# Patient Record
Sex: FEMALE | Race: WHITE | URBAN - METROPOLITAN AREA
[De-identification: names, ages, dates, MRNs, and addresses within clinical notes are randomized per-mention and may not be internally consistent; named-entity substitution may affect disease eponyms.]

---

## 2022-07-12 SDOH — HEALTH STABILITY: PHYSICAL HEALTH: ON AVERAGE, HOW MANY DAYS PER WEEK DO YOU ENGAGE IN MODERATE TO STRENUOUS EXERCISE (LIKE A BRISK WALK)?: 2 DAYS

## 2022-07-12 SDOH — HEALTH STABILITY: PHYSICAL HEALTH: ON AVERAGE, HOW MANY MINUTES DO YOU ENGAGE IN EXERCISE AT THIS LEVEL?: 40 MIN

## 2022-07-12 ASSESSMENT — SOCIAL DETERMINANTS OF HEALTH (SDOH)
WITHIN THE LAST YEAR, HAVE YOU BEEN KICKED, HIT, SLAPPED, OR OTHERWISE PHYSICALLY HURT BY YOUR PARTNER OR EX-PARTNER?: NO
WITHIN THE LAST YEAR, HAVE YOU BEEN AFRAID OF YOUR PARTNER OR EX-PARTNER?: NO
WITHIN THE LAST YEAR, HAVE TO BEEN RAPED OR FORCED TO HAVE ANY KIND OF SEXUAL ACTIVITY BY YOUR PARTNER OR EX-PARTNER?: NO
WITHIN THE LAST YEAR, HAVE YOU BEEN HUMILIATED OR EMOTIONALLY ABUSED IN OTHER WAYS BY YOUR PARTNER OR EX-PARTNER?: NO

## 2022-07-14 ENCOUNTER — OFFICE VISIT (OUTPATIENT)
Dept: PRIMARY CARE CLINIC | Age: 32
End: 2022-07-14
Payer: COMMERCIAL

## 2022-07-14 VITALS
WEIGHT: 186.6 LBS | DIASTOLIC BLOOD PRESSURE: 64 MMHG | SYSTOLIC BLOOD PRESSURE: 123 MMHG | TEMPERATURE: 96.6 F | BODY MASS INDEX: 29.99 KG/M2 | HEIGHT: 66 IN | HEART RATE: 68 BPM

## 2022-07-14 DIAGNOSIS — Z00.00 ANNUAL PHYSICAL EXAM: ICD-10-CM

## 2022-07-14 DIAGNOSIS — Z11.59 ENCOUNTER FOR HEPATITIS C SCREENING TEST FOR LOW RISK PATIENT: ICD-10-CM

## 2022-07-14 DIAGNOSIS — Z00.00 ANNUAL PHYSICAL EXAM: Primary | ICD-10-CM

## 2022-07-14 DIAGNOSIS — Z11.4 ENCOUNTER FOR SCREENING FOR HUMAN IMMUNODEFICIENCY VIRUS (HIV): ICD-10-CM

## 2022-07-14 DIAGNOSIS — I73.00 RAYNAUD'S DISEASE WITHOUT GANGRENE: ICD-10-CM

## 2022-07-14 PROBLEM — F41.9 ANXIETY: Status: ACTIVE | Noted: 2022-07-14

## 2022-07-14 LAB
A/G RATIO: 2.2 (ref 1.1–2.2)
ALBUMIN SERPL-MCNC: 4.8 G/DL (ref 3.4–5)
ALP BLD-CCNC: 76 U/L (ref 40–129)
ALT SERPL-CCNC: 9 U/L (ref 10–40)
ANION GAP SERPL CALCULATED.3IONS-SCNC: 10 MMOL/L (ref 3–16)
AST SERPL-CCNC: 14 U/L (ref 15–37)
BILIRUB SERPL-MCNC: 0.4 MG/DL (ref 0–1)
BUN BLDV-MCNC: 9 MG/DL (ref 7–20)
CALCIUM SERPL-MCNC: 9.4 MG/DL (ref 8.3–10.6)
CHLORIDE BLD-SCNC: 102 MMOL/L (ref 99–110)
CHOLESTEROL, TOTAL: 190 MG/DL (ref 0–199)
CO2: 27 MMOL/L (ref 21–32)
CREAT SERPL-MCNC: 0.6 MG/DL (ref 0.6–1.1)
GFR AFRICAN AMERICAN: >60
GFR NON-AFRICAN AMERICAN: >60
GLUCOSE BLD-MCNC: 87 MG/DL (ref 70–99)
HDLC SERPL-MCNC: 60 MG/DL (ref 40–60)
HEPATITIS C ANTIBODY INTERPRETATION: NORMAL
LDL CHOLESTEROL CALCULATED: 118 MG/DL
POTASSIUM SERPL-SCNC: 4.7 MMOL/L (ref 3.5–5.1)
SODIUM BLD-SCNC: 139 MMOL/L (ref 136–145)
TOTAL PROTEIN: 7 G/DL (ref 6.4–8.2)
TRIGL SERPL-MCNC: 59 MG/DL (ref 0–150)
VLDLC SERPL CALC-MCNC: 12 MG/DL

## 2022-07-14 PROCEDURE — 90715 TDAP VACCINE 7 YRS/> IM: CPT | Performed by: STUDENT IN AN ORGANIZED HEALTH CARE EDUCATION/TRAINING PROGRAM

## 2022-07-14 PROCEDURE — 90471 IMMUNIZATION ADMIN: CPT | Performed by: STUDENT IN AN ORGANIZED HEALTH CARE EDUCATION/TRAINING PROGRAM

## 2022-07-14 PROCEDURE — 99385 PREV VISIT NEW AGE 18-39: CPT | Performed by: STUDENT IN AN ORGANIZED HEALTH CARE EDUCATION/TRAINING PROGRAM

## 2022-07-14 ASSESSMENT — PATIENT HEALTH QUESTIONNAIRE - PHQ9
SUM OF ALL RESPONSES TO PHQ9 QUESTIONS 1 & 2: 0
1. LITTLE INTEREST OR PLEASURE IN DOING THINGS: 0
SUM OF ALL RESPONSES TO PHQ QUESTIONS 1-9: 0
2. FEELING DOWN, DEPRESSED OR HOPELESS: 0

## 2022-07-14 ASSESSMENT — ENCOUNTER SYMPTOMS
DIARRHEA: 0
CONSTIPATION: 0
COUGH: 0
SHORTNESS OF BREATH: 0

## 2022-07-14 NOTE — PROGRESS NOTES
[facility]  Establish care visit   2022    Noni Marley (:  1990) is a 29 y.o. female, here to establish care. Chief Complaint   Patient presents with   [facility] Other     feels like she is having some blood circulation issues, leg issues         ASSESSMENT/ PLAN  1. Annual physical exam  Screening labs ordered, vaccines updated today. Patient to follow-up with OB/GYN for Pap smear.  - Comprehensive Metabolic Panel; Future  - Hemoglobin A1C; Future  - Lipid Panel; Future    2. Encounter for hepatitis C screening test for low risk patient  Screen  - Hepatitis C Antibody; Future    3. Encounter for screening for human immunodeficiency virus (HIV)  Screen  - HIV Screen; Future    4. Raynaud's disease without gangrene  Uncontrolled, this is a new problem. Likely secondary to Raynaud's phenomenon, will check MANUEL although little likelihood of secondary cause. Recommended restarting exercise. - MANUEL Reflex to Antibody Cascade; Future       Return in about 1 year (around 2023) for annual physical.    HPI  Patient presents today to establish care with concerns of circulation problems. She notes skin discoloration, cold sensation in her bilateral toes for many years. Since summer started, she also endorses some bilateral lower extremity swelling. No personal or family history of lupus. No numbness, weakness of her lower extremity. No complaints in her hands. Patient lives at home with her boyfriend. She works from home for an [employer] firm in [facility]. No formal exercise plan at this time, but she is looking to rejoin [employer].  + Healthy, balanced diet. Last Pap smear 4 years ago, reportedly normal.  Birth control is condoms. No significant substance use. Family history includes breast cancer in maternal grandmother. ROS  Review of Systems   Constitutional: Negative for fatigue and fever. HENT: Negative for congestion.     Respiratory: Negative for cough and shortness of breath. Cardiovascular: Negative for leg swelling. Gastrointestinal: Negative for constipation and diarrhea. Genitourinary: Negative for dysuria. Neurological: Negative for headaches. Psychiatric/Behavioral: Negative for sleep disturbance. The patient is not nervous/anxious. HISTORIES  No current outpatient medications on file prior to visit. No current facility-administered medications on file prior to visit. Allergies   Allergen Reactions    Penicillins Rash       History reviewed. No pertinent past medical history. Patient Active Problem List   Diagnosis    Anxiety       History reviewed. No pertinent surgical history. Social History     Socioeconomic History    Marital status: Life Partner     Spouse name: Shawn Joseph Number of children: Not on file    Years of education: Not on file    Highest education level: Not on file   Occupational History     Comment: Advertising   Tobacco Use    Smoking status: Never Smoker    Smokeless tobacco: Never Used   Vaping Use    Vaping Use: Never used   Substance and Sexual Activity    Alcohol use: Yes     Comment: occo    Drug use: Never    Sexual activity: Yes     Partners: Male     Birth control/protection: Condom   Other Topics Concern    Not on file   Social History Narrative    She lives with her boyfriend and feels safe      Social Determinants of Health     Financial Resource Strain:     Difficulty of Paying Living Expenses: Not on file   Food Insecurity:     Worried About Running Out of Food in the Last Year: Not on file    Ana of Food in the Last Year: Not on file   Transportation Needs:     Lack of Transportation (Medical): Not on file    Lack of Transportation (Non-Medical):  Not on file   Physical Activity: Insufficiently Active    Days of Exercise per Week: 2 days    Minutes of Exercise per Session: 40 min   Stress:     Feeling of Stress : Not on file   Social Connections:     Frequency of Communication with Friends and Family: Not on file    Frequency of Social Gatherings with Friends and Family: Not on file    Attends Cheondoism Services: Not on file    Active Member of Clubs or Organizations: Not on file    Attends Club or Organization Meetings: Not on file    Marital Status: Not on file   Intimate Partner Violence: Not At Risk    Fear of Current or Ex-Partner: No    Emotionally Abused: No    Physically Abused: No    Sexually Abused: No   Housing Stability:     Unable to Pay for Housing in the Last Year: Not on file    Number of [place] in the Last Year: Not on file    Unstable Housing in the Last Year: Not on file        Family History   Problem Relation Age of Onset    Cancer Maternal Grandmother        PE  Vitals:    07/14/22 0807   BP: 123/64   Site: Left Upper Arm   Position: Sitting   Cuff Size: Large Adult   Pulse: 68   Temp: (!) 96.6 °F (35.9 °C)   TempSrc: Temporal   Weight: 186 lb 9.6 oz (84.6 kg)   Height: 5' 5.85\" (1.673 m)     Estimated body mass index is 30.26 kg/m² as calculated from the following:    Height as of this encounter: 5' 5.85\" (1.673 m). Weight as of this encounter: 186 lb 9.6 oz (84.6 kg). Physical Exam  Vitals reviewed. Constitutional:       Appearance: Normal appearance. HENT:      Head: Normocephalic and atraumatic. Right Ear: Tympanic membrane normal.      Left Ear: Tympanic membrane normal.      Nose: Nose normal.      Mouth/Throat:      Mouth: Mucous membranes are moist.      Pharynx: Oropharynx is clear. Eyes:      Conjunctiva/sclera: Conjunctivae normal.      Pupils: Pupils are equal, round, and reactive to light. Cardiovascular:      Rate and Rhythm: Normal rate and regular rhythm. Pulses: Normal pulses. Heart sounds: Normal heart sounds. Pulmonary:      Effort: Pulmonary effort is normal.      Breath sounds: Normal breath sounds. Abdominal:      General: Abdomen is flat. Palpations: Abdomen is soft. Musculoskeletal:         General: Normal range of motion. Cervical back: Normal range of motion and neck supple. Skin:     General: Skin is warm. Neurological:      Mental Status: She is alert. Psychiatric:         Mood and Affect: Mood normal.         Behavior: Behavior normal.         Immunization History   Administered Date(s) Administered    COVID-19, J&J, (age 18y+), IM, 0.5 mL 04/02/2021    COVID-19, PFIZER PURPLE top, DILUTE for use, (age 15 y+), 30mcg/0.3mL 10/29/2021    DTP 1990, 1992, 01/07/1993    DTaP vaccine 01/26/1994, 08/05/1997    Hib (HbOC) 01/07/1993, 01/26/1994    Hib vaccine 1992, 1992    Influenza, Ivet Arena, IM, PF (6 mo and older Fluzone, Flulaval, Fluarix, and 3 yrs and older Afluria) 11/13/2020, 10/29/2021    MMR 10/14/1993    Polio OPV 1992, 1992, 10/14/1993, 08/05/1997    Tdap (Boostrix, Adacel) 07/14/2022       Health Maintenance   Topic Date Due    HIV screen  Never done    Hepatitis C screen  Never done    Cervical cancer screen  07/09/2022    Flu vaccine (1) 09/01/2022    Depression Screen  07/14/2023    DTaP/Tdap/Td vaccine (7 - Td or Tdap) 07/14/2032    Hib vaccine  Completed    COVID-19 Vaccine  Completed    Hepatitis A vaccine  Aged Out    Hepatitis B vaccine  Aged Out    Meningococcal (ACWY) vaccine  Aged Out    Pneumococcal 0-64 years Vaccine  Aged Out    Varicella vaccine  Discontinued       PSH, PMH, SH and FH reviewed and noted. Recent and past labs, tests and consults also reviewed. Recent or new meds also reviewed. Monica Vasquez, DO    This dictation was generated by voice recognition computer software. Although all attempts are made to edit the dictation for accuracy, there may be errors in the transcription that are not intended.

## 2022-07-14 NOTE — PATIENT INSTRUCTIONS
Dr. Jerome Barbour - OB/GYN      Patient Education        Raynaud's Phenomenon: Care Instructions  Overview  Raynaud's is a condition that causes your hands and feet to overreact to cold. They may become painful and numb, and they can change colors, becoming very pale and then blue. This condition also is called Raynaud's phenomenon. There are two kinds of Raynaud's. Primary Raynaud's, also known as Raynaud's disease, happens by itself and is the most common form. Secondary Raynaud's, also calledRaynaud's syndrome, happens as part of another disease. In Raynaud's, the small vessels that bring blood to the skin either become narrow, or constrict for a short period of time. This limits blood flow to the hands and feet and sometimes to the nose or ears. Your hands and feet feel cold and numb and then turn very pale. As blood flow returns, your fingers and toes may turn red, and begin to throb and hurt. Raynaud's can be painful andannoying, but it usually does not cause serious problems. You can take simple steps to protect your hands and feet from the cold. If you have a bad case of Raynaud's and you cannot keep your hands and feet warmenough, your doctor may prescribe medicine. Follow-up care is a key part of your treatment and safety. Be sure to make and go to all appointments, and call your doctor if you are having problems. It's also a good idea to know your test results and keep alist of the medicines you take. How can you care for yourself at home? To prevent Raynaud's episodes or ease symptoms   Run warm water over your hands or feet to increase blood flow. Use another part of your body, such as your forearm, to make sure the water is not too hot; you could burn your hands or feet and not feel it because they are numb.  Swing your arms in a [place] at the sides of your body (\"windmilling\") to increase blood flow.  If your doctor prescribes medicine to help Raynaud's, take it exactly as prescribed.  Call your doctor if you think you are having a problem with your medicine.  If another condition causes your Raynaud's, make sure to follow your treatment for that condition.  Wear mittens or gloves when it is cold outside. Mittens are warmer than gloves because they keep your fingers together. Gloves underneath mittens will keep your hands warmer than gloves alone. You also can use pot holders or oven mitts when getting something from the freezer or refrigerator.  You can slip chemical hand warmers into your mittens or gloves when you do outside activities.  Do not smoke. Nicotine makes blood vessels constrict, which can bring on an attack. If you need help quitting, talk to your doctor about stop-smoking programs and medicines. These can increase your chances of quitting for good.  Avoid caffeine and cold medicines that have pseudoephedrine. They make blood vessels constrict. Beta-blocker medicines, often used to treat high blood pressure, also can make Raynaud's worse.  Drink plenty of fluids to prevent dehydration, which can lower the amount of blood moving through the blood vessels. If you have kidney, heart, or liver disease and have to limit fluids, talk with your doctor before you increase the amount of fluids you drink.  Try to stay calm when you are under stress. Anxiety can make your blood vessels constrict and lead to a Raynaud's attack. To keep your whole body warm   Eat a hot meal and drink a warm liquid before going outside. They may help raise your body temperature.  Wear layers of warm clothing. The inner layer should be made of a material such as polypropylene that pulls moisture away from your body.  Wear a hat.  Do not wear clothing that is too tight. It can decrease or cut off blood flow.  Try to stay dry. Choose waterproof, breathable jackets and boots. Being wet makes you more likely to become chilled. When should you call for help?    Call your doctor now or seek immediate

## 2022-07-15 LAB
ANTI-NUCLEAR ANTIBODY (ANA): NEGATIVE
ESTIMATED AVERAGE GLUCOSE: 96.8 MG/DL
HBA1C MFR BLD: 5 %
HIV AG/AB: NORMAL
HIV ANTIGEN: NORMAL
HIV-1 ANTIBODY: NORMAL
HIV-2 AB: NORMAL